# Patient Record
Sex: MALE | Race: WHITE | NOT HISPANIC OR LATINO | Employment: FULL TIME | URBAN - METROPOLITAN AREA
[De-identification: names, ages, dates, MRNs, and addresses within clinical notes are randomized per-mention and may not be internally consistent; named-entity substitution may affect disease eponyms.]

---

## 2017-02-22 ENCOUNTER — OFFICE VISIT (OUTPATIENT)
Dept: FAMILY MEDICINE | Facility: CLINIC | Age: 42
End: 2017-02-22
Payer: COMMERCIAL

## 2017-02-22 VITALS
SYSTOLIC BLOOD PRESSURE: 137 MMHG | OXYGEN SATURATION: 96 % | WEIGHT: 205.13 LBS | HEIGHT: 70 IN | TEMPERATURE: 98.7 F | HEART RATE: 88 BPM | BODY MASS INDEX: 29.37 KG/M2 | DIASTOLIC BLOOD PRESSURE: 89 MMHG

## 2017-02-22 DIAGNOSIS — Z13.220 SCREENING FOR LIPOID DISORDERS: ICD-10-CM

## 2017-02-22 DIAGNOSIS — Z13.1 SCREENING FOR DIABETES MELLITUS: ICD-10-CM

## 2017-02-22 DIAGNOSIS — J01.90 ACUTE SINUSITIS WITH SYMPTOMS > 10 DAYS: Primary | ICD-10-CM

## 2017-02-22 LAB
CHOLEST SERPL-MCNC: 214 MG/DL
HBA1C MFR BLD: 5.1 % (ref 4.3–6)
HDLC SERPL-MCNC: 62 MG/DL
LDLC SERPL CALC-MCNC: 124 MG/DL
NONHDLC SERPL-MCNC: 152 MG/DL
TRIGL SERPL-MCNC: 140 MG/DL

## 2017-02-22 PROCEDURE — 83036 HEMOGLOBIN GLYCOSYLATED A1C: CPT | Performed by: PHYSICIAN ASSISTANT

## 2017-02-22 PROCEDURE — 99214 OFFICE O/P EST MOD 30 MIN: CPT | Performed by: PHYSICIAN ASSISTANT

## 2017-02-22 PROCEDURE — 80061 LIPID PANEL: CPT | Performed by: PHYSICIAN ASSISTANT

## 2017-02-22 PROCEDURE — 36415 COLL VENOUS BLD VENIPUNCTURE: CPT | Performed by: PHYSICIAN ASSISTANT

## 2017-02-22 NOTE — NURSING NOTE
"Chief Complaint   Patient presents with     URI       Initial /89  Pulse 88  Temp 98.7  F (37.1  C) (Oral)  Ht 5' 10\" (1.778 m)  Wt 205 lb 2 oz (93 kg)  SpO2 96%  BMI 29.43 kg/m2 Estimated body mass index is 29.43 kg/(m^2) as calculated from the following:    Height as of this encounter: 5' 10\" (1.778 m).    Weight as of this encounter: 205 lb 2 oz (93 kg).  Medication Reconciliation: complete     Sallie Hernandez CMA      "

## 2017-02-22 NOTE — MR AVS SNAPSHOT
After Visit Summary   2/22/2017    Nicholas Cooney    MRN: [id number]           Patient Information     Date Of Birth          1975        Visit Information        Provider Department      2/22/2017 9:20 AM Stephane Beltran PA [facility]        Today's Diagnoses     Acute sinusitis with symptoms > 10 days    -  1    Screening for lipoid disorders        Screening for diabetes mellitus          Care Instructions      Sinusitis [Abx Tx]    The sinuses are air-filled spaces within the bones of the face. They connect to the inside of the nose. Sinusitis is an inflammation of the tissue lining the sinus cavity. Sinus inflammation can occur during a cold or hay-fever (allergies to pollens and other particles in the air) and cause symptoms of sinus congestion and fullness. A sinus infection causes fever, headache and facial pain. There is usually green or yellow drainage from the nose or into the back of the throat (post-nasal drip). Antibiotics are prescribed to treat this condition.  Home Care:  Drink plenty of water, hot tea, and other liquids to stay well hydrated. This thins the mucus and promotes sinus drainage.  Apply heat to the painful areas of the face. Use a towel soaked in hot water. Or,  the shower and direct the hot spray onto your face. This is a good way to inhale warm water vapor and get heat on your face at the same time. (Cover your mouth and nose with your hands so you can still breathe as you do this.)  Use a vaporizer with products such as Vicks VapoRub (contains menthol) at night. Suck on peppermint, menthol or eucalyptus hard candies during the day.  An expectorant containing guaifenesin (such as Robitussin), helps to thin the mucus and promote drainage from the sinuses.  Over-the-counter decongestants may be used unless a similar medicine was prescribed. Nasal sprays work the fastest. Use one that contains phenylephrine (Jarret-synephrine, Sinex  and others) or oxymetazoline (Afrin). First blow the nose gently to remove mucus, then apply the drops. Do not use these medicines more often than directed on the label or for more than three days or symptoms may worsen. You may also use tablets containing pseudoephedrine (Sudafed). Many sinus remedies combine ingredients, which may increase side effects. Read the labels or ask the pharmacist for help. NOTE: Persons with high blood pressure should not use decongestants. They can raise blood pressure.  Antihistamines are useful if allergies are a cause of your sinusitis. The mildest one is chlorpheniramine (available without a prescription). The dose for adults is 8-12mg three times a day. [NOTE: Do not use chlorpheniramine if you have glaucoma or if you are a man with trouble urinating due to an enlarged prostate.] Claritin (loratidine) is an antihistamine that causes less drowsiness and is a good alternative for daytime use.  Do not use nasal rinses or irrigation during an acute sinus infection, unless advised by your doctor. Rinsing may spread the infection to other sinuses.  You may use acetaminophen (Tylenol) or ibuprofen (Motrin, Advil) to control pain, unless another pain medicine was prescribed. [ NOTE: If you have chronic liver or kidney disease or ever had a stomach ulcer, talk with your doctor before using these medicines.] (Aspirin should never be used in anyone under 18 years of age who is ill with a fever. It may cause severe liver damage.)  Finish the full course, even if you are feeling better after a few days.  Follow Up  with your doctor or this facility in one week or as instructed by our staff if not improving.  Get Prompt Medical Attention  if any of the following occur:  Facial pain or headache becomes more severe  Stiff neck  Unusual drowsiness or confusion, or not acting like your normal self  Swelling of the forehead or eyelids  Vision problems including blurred or double vision  Fever of  "100.4 F (38 C) or higher, or as directed by your healthcare provider  Seizure    [id number] [employer]il[employer], [address] [place] [address], [place] [address]. All rights reserved. This information is not intended as a substitute for professional medical care. Always follow your healthcare professional's instructions.              Follow-ups after your visit        Follow-up notes from your care team     Return if symptoms worsen or fail to improve.      Who to contact     If you have questions or need follow up information about today's clinic visit or your schedule please contact [facility] directly at [phone].  Normal or non-critical lab and imaging results will be communicated to you by [employer], letter or phone within 4 business days after the clinic has received the results. If you do not hear from us within 7 days, please contact the clinic through [employer] or phone. If you have a critical or abnormal lab result, we will notify you by phone as soon as possible.  Submit refill requests through [facility] or call your pharmacy and they will forward the refill request to us. Please allow 3 business days for your refill to be completed.          Additional Information About Your Visit        MyChart Information     [facility] lets you send messages to your doctor, view your test results, renew your prescriptions, schedule appointments and more. To sign up, go to [email or web address] . Click on \"Log in\" on the left side of the screen, which will take you to the Welcome page. Then click on \"Sign up Now\" on the right side of the page.     You will be asked to enter the access code listed below, as well as some personal information. Please follow the directions to create your username and password.     Your access code is: [email or web address]  Expires: 2017  9:30 AM     Your access code will  in 90 days. If you need help or a new code, please call your [facility] or [phone].        Care " "EveryWhere ID     This is your Care EveryWhere ID. This could be used by other organizations to access your [facility] medical records  [id number]        Your Vitals Were     Pulse Temperature Height Pulse Oximetry BMI (Body Mass Index)       88 98.7  F (37.1  C) (Oral) 5' 10\" (1.778 m) 96% 29.43 kg/m2        Blood Pressure from Last 3 Encounters:   02/22/17 137/89   11/04/14 129/85   10/21/14 129/87    Weight from Last 3 Encounters:   02/22/17 205 lb 2 oz (93 kg)   11/04/14 201 lb 6 oz (91.3 kg)   10/21/14 201 lb 6.4 oz (91.4 kg)              We Performed the Following     Hemoglobin A1c     Lipid panel reflex to direct LDL          Today's Medication Changes          These changes are accurate as of: 2/22/17  9:30 AM.  If you have any questions, ask your nurse or doctor.               Start taking these medicines.        Dose/Directions    amoxicillin-clavulanate 875-125 MG per tablet   Commonly known as:  AUGMENTIN   Used for:  Acute sinusitis with symptoms > 10 days   Started by:  Stephane Beltran PA        Dose:  1 tablet   Take 1 tablet by mouth 2 times daily for 7 days   Quantity:  14 tablet   Refills:  0            Where to get your medicines      These medications were sent to [facility] - [place], [place] - [address]  [address], [facility] [address]     Phone:  [phone]     amoxicillin-clavulanate 875-125 MG per tablet                Primary Care Provider    None Specified       No primary provider on file.        Thank you!     Thank you for choosing [facility]  for your care. Our goal is always to provide you with excellent care. Hearing back from our patients is one way we can continue to improve our services. Please take a few minutes to complete the written survey that you may receive in the mail after your visit with us. Thank you!             Your Updated Medication List - Protect others around you: Learn how to safely use, " store and throw away your medicines at www.disposemymeds.org.          This list is accurate as of: 2/22/17  9:30 AM.  Always use your most recent med list.                   Brand Name Dispense Instructions for use    amoxicillin-clavulanate 875-125 MG per tablet    AUGMENTIN    14 tablet    Take 1 tablet by mouth 2 times daily for 7 days

## 2017-02-22 NOTE — PROGRESS NOTES
"  SUBJECTIVE:                                                    Nicholas Cooney is a 41 year old male who presents to clinic today for the following health issues:      Acute Illness   Acute illness concerns: cough  Onset: 10 days ago     Fever: no    Chills/Sweats: YES    Headache (location?): no    Sinus Pressure:YES    Conjunctivitis:  no    Ear Pain: YES: both    Rhinorrhea: YES    Congestion: YES    Sore Throat: YES     Cough: YES-non-productive, productive of yellow sputum    Wheeze: no    Decreased Appetite: no    Nausea: no    Vomiting: no    Diarrhea:  no    Dysuria/Freq.: no    Fatigue/Achiness: YES    Sick/Strep Exposure: YES- parents      Therapies Tried and outcome: vics day and night      patient had only a few sips of soda this am.        No Known Allergies    Past Medical History   Diagnosis Date     CARDIOVASCULAR SCREENING; LDL GOAL LESS THAN 160 5/12/2011         No current outpatient prescriptions on file prior to visit.  No current facility-administered medications on file prior to visit.     Social History   Substance Use Topics     Smoking status: Never Smoker     Smokeless tobacco: Never Used     Alcohol use Yes      Comment: social       ROS:  Consitutional: As above  ENT: As above  Respiratory: As above    OBJECTIVE:  /89  Pulse 88  Temp 98.7  F (37.1  C) (Oral)  Ht 5' 10\" (1.778 m)  Wt 205 lb 2 oz (93 kg)  SpO2 96%  BMI 29.43 kg/m2  GENERAL APPEARANCE: healthy, alert and no distress  EYES: conjunctiva clear  HENT:   TMs w/o erythema, effusion or bulging.  Nose and mouth without ulcers, erythema or lesions.  NO tonsillar enlargement erythema or exudates.   NECK: supple, nontender, no lymphadenopathy  RESP: lungs clear to auscultation - no rales, rhonchi or wheezes  CV: regular rates and rhythm, normal S1 S2, no murmur noted  NEURO: awake, alert          ASSESSMENT: Well appearing.    ICD-10-CM    1. Acute sinusitis with symptoms > 10 days J01.90 amoxicillin-clavulanate (AUGMENTIN) " 875-125 MG per tablet   2. Screening for lipoid disorders Z13.220 Lipid panel reflex to direct LDL   3. Screening for diabetes mellitus Z13.1 Hemoglobin A1c         PLAN:advised routine yearly follow up  Lots of rest and fluids.  RTC if any worsening symptoms or if not improving.    Hemanth Beltran PA-C

## 2017-02-22 NOTE — PROGRESS NOTES
Please mail letter to patient as below:     Your LDL (bad cholesterol)  was mildly above goal.  Genetics, diet, weight and low exercise levels can contribute to this. Your HDL (good cholesterol) and triglycerides were normal.  This is good. You need to recheck fasting labs yearly. Maintaining a healthy diet with lean proteins, whole grains and healthy fats such as olive oil as well as regular exercise and maintaining an appropriate weight all contribute to healthier cholesterol levels. Your blood sugar was normal.      Stephane Beltran

## 2017-02-22 NOTE — PATIENT INSTRUCTIONS
Sinusitis [Abx Tx]    The sinuses are air-filled spaces within the bones of the face. They connect to the inside of the nose. Sinusitis is an inflammation of the tissue lining the sinus cavity. Sinus inflammation can occur during a cold or hay-fever (allergies to pollens and other particles in the air) and cause symptoms of sinus congestion and fullness. A sinus infection causes fever, headache and facial pain. There is usually green or yellow drainage from the nose or into the back of the throat (post-nasal drip). Antibiotics are prescribed to treat this condition.  Home Care:  Drink plenty of water, hot tea, and other liquids to stay well hydrated. This thins the mucus and promotes sinus drainage.  Apply heat to the painful areas of the face. Use a towel soaked in hot water. Or,  the shower and direct the hot spray onto your face. This is a good way to inhale warm water vapor and get heat on your face at the same time. (Cover your mouth and nose with your hands so you can still breathe as you do this.)  Use a vaporizer with products such as Vicks VapoRub (contains menthol) at night. Suck on peppermint, menthol or eucalyptus hard candies during the day.  An expectorant containing guaifenesin (such as Robitussin), helps to thin the mucus and promote drainage from the sinuses.  Over-the-counter decongestants may be used unless a similar medicine was prescribed. Nasal sprays work the fastest. Use one that contains phenylephrine (Jarret-synephrine, Sinex and others) or oxymetazoline (Afrin). First blow the nose gently to remove mucus, then apply the drops. Do not use these medicines more often than directed on the label or for more than three days or symptoms may worsen. You may also use tablets containing pseudoephedrine (Sudafed). Many sinus remedies combine ingredients, which may increase side effects. Read the labels or ask the pharmacist for help. NOTE: Persons with high blood pressure should not use  decongestants. They can raise blood pressure.  Antihistamines are useful if allergies are a cause of your sinusitis. The mildest one is chlorpheniramine (available without a prescription). The dose for adults is 8-12mg three times a day. [NOTE: Do not use chlorpheniramine if you have glaucoma or if you are a man with trouble urinating due to an enlarged prostate.] Claritin (loratidine) is an antihistamine that causes less drowsiness and is a good alternative for daytime use.  Do not use nasal rinses or irrigation during an acute sinus infection, unless advised by your doctor. Rinsing may spread the infection to other sinuses.  You may use acetaminophen (Tylenol) or ibuprofen (Motrin, Advil) to control pain, unless another pain medicine was prescribed. [ NOTE: If you have chronic liver or kidney disease or ever had a stomach ulcer, talk with your doctor before using these medicines.] (Aspirin should never be used in anyone under 18 years of age who is ill with a fever. It may cause severe liver damage.)  Finish the full course, even if you are feeling better after a few days.  Follow Up  with your doctor or this facility in one week or as instructed by our staff if not improving.  Get Prompt Medical Attention  if any of the following occur:  Facial pain or headache becomes more severe  Stiff neck  Unusual drowsiness or confusion, or not acting like your normal self  Swelling of the forehead or eyelids  Vision problems including blurred or double vision  Fever of 100.4 F (38 C) or higher, or as directed by your healthcare provider  Seizure    [id number] [facility], [address] [place] [address], [place] [address]. All rights reserved. This information is not intended as a substitute for professional medical care. Always follow your healthcare professional's instructions.

## 2019-04-11 ENCOUNTER — OFFICE VISIT (OUTPATIENT)
Dept: OPHTHALMOLOGY | Facility: CLINIC | Age: 44
End: 2019-04-11
Payer: COMMERCIAL

## 2019-04-11 DIAGNOSIS — Z98.890 S/P LASIK SURGERY OF BOTH EYES: Primary | ICD-10-CM

## 2019-04-11 DIAGNOSIS — H52.4 PRESBYOPIA: ICD-10-CM

## 2019-04-11 DIAGNOSIS — H04.123 DRY EYE SYNDROME, BILATERAL: ICD-10-CM

## 2019-04-11 DIAGNOSIS — H02.402 PTOSIS OF EYELID, LEFT: ICD-10-CM

## 2019-04-11 PROCEDURE — 92015 DETERMINE REFRACTIVE STATE: CPT | Performed by: STUDENT IN AN ORGANIZED HEALTH CARE EDUCATION/TRAINING PROGRAM

## 2019-04-11 PROCEDURE — 92004 COMPRE OPH EXAM NEW PT 1/>: CPT | Performed by: STUDENT IN AN ORGANIZED HEALTH CARE EDUCATION/TRAINING PROGRAM

## 2019-04-11 ASSESSMENT — TONOMETRY
OS_IOP_MMHG: 16
OD_IOP_MMHG: 16
IOP_METHOD: APPLANATION

## 2019-04-11 ASSESSMENT — REFRACTION_MANIFEST
OD_AXIS: 012
OS_SPHERE: PLANO
OD_CYLINDER: +0.75
OD_ADD: +1.50
OS_ADD: +1.50
OS_CYLINDER: SPHERE
OD_SPHERE: -0.25

## 2019-04-11 ASSESSMENT — REFRACTION
OD_AXIS: 010
OD_SPHERE: +1.00
OD_CYLINDER: +0.75

## 2019-04-11 ASSESSMENT — EXTERNAL EXAM - LEFT EYE: OS_EXAM: MILD BROW PTOSIS

## 2019-04-11 ASSESSMENT — CUP TO DISC RATIO
OS_RATIO: 0.45
OD_RATIO: 0.4

## 2019-04-11 ASSESSMENT — CONF VISUAL FIELD
OS_NORMAL: 1
OD_NORMAL: 1

## 2019-04-11 ASSESSMENT — VISUAL ACUITY
OD_SC: 20/30+3
OD_SC: J3
OS_SC: 20/20
OS_SC: J2
METHOD: SNELLEN - LINEAR

## 2019-04-11 ASSESSMENT — EXTERNAL EXAM - RIGHT EYE: OD_EXAM: NORMAL

## 2019-04-11 ASSESSMENT — SLIT LAMP EXAM - LIDS
COMMENTS: NORMAL
COMMENTS: MILD PTOSIS

## 2019-04-11 NOTE — PROGRESS NOTES
" Current Eye Medications:  Visine tears prn     Subjective:  Comprehensive eye exam.   Pt states he first noticed last week that vision in his right is a little blurred. Pt states he had Lasik about 13 years ago and wonders if he needs to have an enhancement.    Tech note: great difference between  lid fissure openings, left eye droops and right eye almost looks proptotic. Patient states this difference happened about a year ago, has been gradual, and is common in his family.     Objective:  See Ophthalmology Exam.       Assessment:  Nicholas Cooney is a 43 year old male who presents with:   Encounter Diagnoses   Name Primary?     S/P LASIK surgery of both eyes      Ptosis of eyelid, left Not acute, possible miosis (was dilated prior to MD evaluation).     Dry eye syndrome, bilateral        Plan:  Use over the counter readers (+1.50)    Use artificial tears up to four times a day (like Refresh Optive, Systane Balance, TheraTears, or generic artificial tears are ok. Avoid \"get the red out\" drops).     Return for evaluation for possible Pb's syndrome (with lid droopiness and unequal pupils)    Form for LASIK Plus completed per patient request    Cheng Ro MD  [phone]      "

## 2019-04-11 NOTE — PATIENT INSTRUCTIONS
"Use over the counter readers (+1.50)    Use artificial tears up to four times a day (like Refresh Optive, Systane Balance, TheraTears, or generic artificial tears are ok. Avoid \"get the red out\" drops).     Return for evaluation for possible Pb's syndrome (with lid droopiness and unequal pupils)    Cheng Ro MD  [phone]    "

## 2019-04-11 NOTE — LETTER
"    4/11/2019         RE: Nicholas Cooney  [address]  [place] [address]        Dear Colleague,    Thank you for referring your patient, Nicholas Cooney, to the [facility]. Please see a copy of my visit note below.     Current Eye Medications:  Visine tears prn     Subjective:  Comprehensive eye exam.   Pt states he first noticed last week that vision in his right is a little blurred. Pt states he had Lasik about 13 years ago and wonders if he needs to have an enhancement.    Tech note: great difference between  lid fissure openings, left eye droops and right eye almost looks proptotic. Patient states this difference happened about a year ago, has been gradual, and is common in his family.     Objective:  See Ophthalmology Exam.       Assessment:  Nicholas Cooney is a 43 year old male who presents with:   Encounter Diagnoses   Name Primary?     S/P LASIK surgery of both eyes      Ptosis of eyelid, left Not acute, possible miosis (was dilated prior to MD evaluation).     Dry eye syndrome, bilateral        Plan:  Use over the counter readers (+1.50)    Use artificial tears up to four times a day (like Refresh Optive, Systane Balance, TheraTears, or generic artificial tears are ok. Avoid \"get the red out\" drops).     Return for evaluation for possible Pb's syndrome (with lid droopiness and unequal pupils)    Form for LASIK Plus completed per patient request    Cheng Ro MD  [phone]        Again, thank you for allowing me to participate in the care of your patient.        Sincerely,        Cheng Ro MD    "

## 2019-04-26 ENCOUNTER — OFFICE VISIT (OUTPATIENT)
Dept: OPHTHALMOLOGY | Facility: CLINIC | Age: 44
End: 2019-04-26
Payer: COMMERCIAL

## 2019-04-26 DIAGNOSIS — Z98.890 S/P LASIK SURGERY OF BOTH EYES: ICD-10-CM

## 2019-04-26 DIAGNOSIS — H04.123 DRY EYE SYNDROME, BILATERAL: ICD-10-CM

## 2019-04-26 DIAGNOSIS — H57.02 ANISOCORIA: Primary | ICD-10-CM

## 2019-04-26 DIAGNOSIS — H02.402 PTOSIS OF EYELID, LEFT: ICD-10-CM

## 2019-04-26 PROCEDURE — 92012 INTRM OPH EXAM EST PATIENT: CPT | Performed by: STUDENT IN AN ORGANIZED HEALTH CARE EDUCATION/TRAINING PROGRAM

## 2019-04-26 ASSESSMENT — EXTERNAL EXAM - RIGHT EYE: OD_EXAM: NORMAL

## 2019-04-26 ASSESSMENT — SLIT LAMP EXAM - LIDS: COMMENTS: NORMAL

## 2019-04-26 ASSESSMENT — VISUAL ACUITY
OS_SC: 20/20
OD_SC: 20/30-2
METHOD: SNELLEN - LINEAR
OD_PH_SC: 20/20

## 2019-04-26 ASSESSMENT — REFRACTION_MANIFEST
OD_CYLINDER: +0.75
OD_SPHERE: -0.25
OD_AXIS: 012

## 2019-04-26 NOTE — LETTER
4/26/2019         RE: Nicholas Cooney  [address] [place] [address] [address]  [place] [address]        Dear Colleague,    Thank you for referring your patient, Nicholas Cooney, to the [facility]. Please see a copy of my visit note below.     Current Eye Medications:  Tears daily right eye     Subjective: Pupil check/possible pilocarpine or apraclonidine test.  Pt report vision in his right eye continues to be blurred.     Objective:  See Ophthalmology Exam.      Assessment:  Nicholas Cooney is a 43 year old male who presents with:   Encounter Diagnoses   Name Primary?     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 Physiologic, possibly Adie's - asymptomatic.      S/P LASIK surgery of both eyes      Ptosis of eyelid, left      Dry eye syndrome, bilateral        Plan:  Recommend annual eye exams    Cheng Ro MD  [phone]        Again, thank you for allowing me to participate in the care of your patient.        Sincerely,        Cheng Ro MD

## 2019-04-26 NOTE — PROGRESS NOTES
Current Eye Medications:  Tears daily right eye     Subjective: Pupil check/possible pilocarpine or apraclonidine test.  Pt report vision in his right eye continues to be blurred.     Objective:  See Ophthalmology Exam.      Assessment:  Nicholas Cooney is a 43 year old male who presents with:   Encounter Diagnoses   Name Primary?     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 Physiologic, possibly Adie's - asymptomatic.      S/P LASIK surgery of both eyes      Ptosis of eyelid, left      Dry eye syndrome, bilateral        Plan:  Recommend annual eye exams    Cheng Ro MD  [phone]

## 2021-06-12 ENCOUNTER — HEALTH MAINTENANCE LETTER (OUTPATIENT)
Age: 46
End: 2021-06-12

## 2021-10-02 ENCOUNTER — HEALTH MAINTENANCE LETTER (OUTPATIENT)
Age: 46
End: 2021-10-02

## 2022-07-09 ENCOUNTER — HEALTH MAINTENANCE LETTER (OUTPATIENT)
Age: 47
End: 2022-07-09

## 2022-09-03 ENCOUNTER — HEALTH MAINTENANCE LETTER (OUTPATIENT)
Age: 47
End: 2022-09-03

## 2023-01-13 ENCOUNTER — OFFICE VISIT (OUTPATIENT)
Dept: URGENT CARE | Facility: URGENT CARE | Age: 48
End: 2023-01-13
Payer: COMMERCIAL

## 2023-01-13 ENCOUNTER — ANCILLARY PROCEDURE (OUTPATIENT)
Dept: GENERAL RADIOLOGY | Facility: CLINIC | Age: 48
End: 2023-01-13
Attending: NURSE PRACTITIONER
Payer: COMMERCIAL

## 2023-01-13 VITALS
SYSTOLIC BLOOD PRESSURE: 125 MMHG | DIASTOLIC BLOOD PRESSURE: 84 MMHG | TEMPERATURE: 98 F | OXYGEN SATURATION: 97 % | BODY MASS INDEX: 28.48 KG/M2 | WEIGHT: 198.5 LBS | HEART RATE: 77 BPM

## 2023-01-13 DIAGNOSIS — S69.92XA WRIST INJURY, LEFT, INITIAL ENCOUNTER: ICD-10-CM

## 2023-01-13 DIAGNOSIS — S62.115A CLOSED NONDISPLACED FRACTURE OF TRIQUETRUM OF LEFT WRIST, INITIAL ENCOUNTER: Primary | ICD-10-CM

## 2023-01-13 PROCEDURE — 73110 X-RAY EXAM OF WRIST: CPT | Mod: TC | Performed by: RADIOLOGY

## 2023-01-13 PROCEDURE — 99204 OFFICE O/P NEW MOD 45 MIN: CPT | Performed by: NURSE PRACTITIONER

## 2023-01-13 NOTE — PROGRESS NOTES
Assessment & Plan     Closed nondisplaced fracture of triquetrum of left wrist, initial encounter    - Orthopedic  Referral    Wrist injury, left, initial encounter    - XR Wrist Left G/E 3 Views     Reviewed xray images and results during visit showing nondisplaced left wrist triquetrum fracture. He is splinted with blue premade volar splint and ACE wrap. RICE: rest, ice, compress, elevate. 800 mg ibuprofen with 1000 mg tylenol with food every 8 hours as needed. Urgent orthopedics referral placed for further evaluation. Neurovascularly stable currently.     Follow-up with orthopedics within 5 days, and sooner if symptoms worsen or new symptoms develop.     Discussed red flag symptoms which warrant immediate visit in emergency room    All questions were answered and patient verbalized understanding. AVS reviewed with patient.     Jaky Iyer, YUE, APRN, CNP 1/13/2023 11:24 AM  [facility] 47 [facility] who presents to clinic today for the following health issues:  Chief Complaint   Patient presents with     Wrist Injury     Pt was walking dog last night tripped on ice and fell, pt fell backwards nd caught himself with left hand, swollen, painful to twist left wrist       MS Injury/Pain    Onset of symptoms was 1 day(s) ago.  Location: left wrist  Context:  The injury happened while falling on ice while walking his dog landing on left hand  Course of symptoms is same.    Severity moderate  Current and Associated symptoms: Pain, Swelling and Decreased range of motion  Denies  Bruising, Warmth and Redness  Aggravating Factors: movement  Therapies to improve symptoms include: none  This is not the first time this type of problem has occurred for this patient.   He broke his left wrist years ago    Problem list, Medication list, Allergies, and Medical history reviewed in EPIC.    ROS:  Review of systems negative except for noted above         Objective    /84 (BP Location: Left arm, Patient Position: Sitting, Cuff Size: Adult Regular)   Pulse 77   Temp 98  F (36.7  C) (Tympanic)   Wt 90 kg (198 lb 8 oz)   SpO2 97%   BMI 28.48 kg/m    Physical Exam  Constitutional:       General: He is not in acute distress.     Appearance: He is not toxic-appearing or diaphoretic.   Cardiovascular:      Pulses: Normal pulses.   Musculoskeletal:      Left forearm: Normal.      Left wrist: Swelling and bony tenderness present. Decreased range of motion.      Left hand: Normal.      Comments: Moderate swelling left wrist. Able to pronate and supinate. Decreased ROM flexion and extension left wrist. Tenderness with palpation medial left wrist   Skin:     General: Skin is warm and dry.      Capillary Refill: Capillary refill takes less than 2 seconds.      Findings: Bruising present. No erythema.      Comments: Mild bruising left wrist   Neurological:      Mental Status: He is alert.      Sensory: No sensory deficit.          X-ray left wrist was performed and reviewed independently by myself showing possible medial left wrist fracture  Radiologist impression:   Results for orders placed or performed in visit on 01/13/23   XR Wrist Left G/E 3 Views     Status: None (Preliminary result)    Narrative    EXAM: WRIST LEFT THREE OR MORE VIEWS  DATE/TIME: 1/13/2023 10:39 AM    INDICATION: Wrist pain after fall. Wrist injury, left, initial  encounter.  COMPARISON: None available.       Impression    IMPRESSION: Nondisplaced dorsal left triquetral fracture. Moderate  dorsal left wrist soft tissue swelling. Chronic healed screw-fixed  left scaphoid fracture without evidence for hardware failure. Mild STT  joint osteoarthritis. No advanced joint space narrowing. No definite  distal radius or ulna fracture.

## 2023-01-18 NOTE — PROGRESS NOTES
ASSESSMENT & PLAN    Diagnoses and all orders for this visit:    Closed nondisplaced fracture of triquetrum of left wrist, initial encounter  -     XR Wrist Left G/E 3 Views; Future  -     Orthopedic  Referral      This issue is acute and Unchanged.    We discussed these other possible diagnosis: Triquetral fracture, discussed immobilization typically 4-6 weeks.    Plan:  - Today's Plan of Care:  Discussed immobilization options - Exos given upcoming trip  Limit heavy lifting, weight bearing left hand    Discussed activity considerations and other supportive care including Ice/Heat, OTC and other topical medications as needed.    -We also discussed other future treatment options:  Referral to Occupational Hand Therapy    Follow Up: 4 weeks with repeat x-rays    Concerning signs and symptoms were reviewed.  The patient expressed understanding of this management plan and all questions were answered at this time.    Samira Spear MD [facility]  Sports Medicine Physician  [facility]      -----  No chief complaint on file.      SUBJECTIVE  Nicholas Cooney is a/an 47 year old male who is seen as an  referral for evaluation of left wrist injury.     The patient is seen by themselves.  The patient is Right handed    Onset: 1 week(s) ago. 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     REVIEW OF SYSTEMS:  Review of Systems  Skin: no bruising, yes swelling  Musculoskeletal: as above  Neurologic: no numbness, paresthesias  Remainder of review of systems is  negative including constitutional, CV, pulmonary, GI, except as noted in HPI or medical history.    OBJECTIVE:  BP (!) 146/79 (BP Location: Right arm)   Pulse 85   Wt 89.8 kg (198 lb)   BMI 28.41 kg/m     General: healthy, alert and in no distress  HEENT: no scleral icterus or conjunctival erythema  Skin: no suspicious lesions or rash. No jaundice.  CV: distal perfusion intact   Resp: normal respiratory effort without conversational dyspnea   Psych: normal mood and affect  Gait: NORMAL  Neuro: Normal light sensory exam of upper extremity    Bilateral Wrist and Hand exam    Inspection:       Swelling: mild left wrist    Tender:       Dorsal wrist    Non Tender:       Remainder of the Wrist and Hand bilateral    ROM:       Slightly decreased ROM left wrist    Strength:       5/5 strength in the muscles of the hand, wrist and forearm bilateral    Neurovascular:       2+ radial pulses bilaterally with brisk capillary refill and      normal sensation to light touch in the radial, median and ulnar nerve distributions    RADIOLOGY:  I independently ordered, visualized and reviewed these images with the patient  3 XR views of left wrist reviewed and compared to 1/13/2023 : small non displaced dorsal triquetral fracture, healed scaphoid fracture with screw fixation, mild STT joint degenerative changes  - will follow official read      Review of prior external note(s) from -  note  Review of the result(s) of each unique test - XR

## 2023-01-19 ENCOUNTER — OFFICE VISIT (OUTPATIENT)
Dept: ORTHOPEDICS | Facility: CLINIC | Age: 48
End: 2023-01-19
Attending: NURSE PRACTITIONER
Payer: COMMERCIAL

## 2023-01-19 ENCOUNTER — ANCILLARY PROCEDURE (OUTPATIENT)
Dept: GENERAL RADIOLOGY | Facility: CLINIC | Age: 48
End: 2023-01-19
Attending: PEDIATRICS
Payer: COMMERCIAL

## 2023-01-19 VITALS
SYSTOLIC BLOOD PRESSURE: 146 MMHG | WEIGHT: 198 LBS | HEART RATE: 85 BPM | DIASTOLIC BLOOD PRESSURE: 79 MMHG | BODY MASS INDEX: 28.41 KG/M2

## 2023-01-19 DIAGNOSIS — S62.115A CLOSED NONDISPLACED FRACTURE OF TRIQUETRUM OF LEFT WRIST, INITIAL ENCOUNTER: ICD-10-CM

## 2023-01-19 PROCEDURE — 99204 OFFICE O/P NEW MOD 45 MIN: CPT | Performed by: PEDIATRICS

## 2023-01-19 PROCEDURE — 73110 X-RAY EXAM OF WRIST: CPT | Mod: TC | Performed by: RADIOLOGY

## 2023-01-19 NOTE — PATIENT INSTRUCTIONS
We discussed these other possible diagnosis: Triquetral fracture, discussed immobilization typically 4-6 weeks.    Plan:  - Today's Plan of Care:  Discussed immobilization options - Exos given upcoming trip  Limit heavy lifting, weight bearing left hand    Discussed activity considerations and other supportive care including Ice/Heat, OTC and other topical medications as needed.    -We also discussed other future treatment options:  Referral to Occupational Hand Therapy    Follow Up: 4 weeks with repeat x-rays    If you have any further questions for your physician or physician s care team you can call [phone] and use option 3 to leave a voice message.

## 2023-01-19 NOTE — LETTER
1/19/2023         RE: Nicholas Cooney  [address]  [place] [address]        Dear Colleague,    Thank you for referring your patient, Nicholas Cooney, to the [facility]. Please see a copy of my visit note below.    ASSESSMENT & PLAN    Diagnoses and all orders for this visit:    Closed nondisplaced fracture of triquetrum of left wrist, initial encounter  -     XR Wrist Left G/E 3 Views; Future  -     Orthopedic  Referral      This issue is acute and Unchanged.    We discussed these other possible diagnosis: Triquetral fracture, discussed immobilization typically 4-6 weeks.    Plan:  - Today's Plan of Care:  Discussed immobilization options - [place]os given upcoming trip  Limit heavy lifting, weight bearing left hand    Discussed activity considerations and other supportive care including Ice/Heat, OTC and other topical medications as needed.    -We also discussed other future treatment options:  Referral to Occupational Hand Therapy    Follow Up: 4 weeks with repeat x-rays    Concerning signs and symptoms were reviewed.  The patient expressed understanding of this management plan and all questions were answered at this time.    Samira Spear MD [facility]  Sports Medicine Physician  [facility]      -----  No chief complaint on file.      SUBJECTIVE  Nicholas Cooney is a/an 47 year old male who is seen as an UC referral for evaluation of left wrist injury.     The patient is seen by themselves.  The patient is Right handed    Onset: 1 week(s) ago. 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     REVIEW OF SYSTEMS:  Review of Systems  Skin: no bruising, yes swelling  Musculoskeletal: as above  Neurologic: no numbness, paresthesias  Remainder of review of systems is negative including constitutional, CV, pulmonary, GI, except as noted in HPI or medical history.    OBJECTIVE:  BP (!) 146/79 (BP Location: Right arm)   Pulse 85   Wt 89.8 kg (198 lb)   BMI 28.41 kg/m     General: healthy, alert and in no distress  HEENT: no scleral icterus or conjunctival erythema  Skin: no suspicious lesions or rash. No jaundice.  CV: distal perfusion intact   Resp: normal respiratory effort without conversational dyspnea   Psych: normal mood and affect  Gait: NORMAL  Neuro: Normal light sensory exam of upper extremity    Bilateral Wrist and Hand exam    Inspection:       Swelling: mild left wrist    Tender:       Dorsal wrist    Non Tender:       Remainder of the Wrist and Hand bilateral    ROM:       Slightly decreased ROM left wrist    Strength:       5/5 strength in the muscles of the hand, wrist and forearm bilateral    Neurovascular:       2+ radial pulses bilaterally with brisk capillary refill and      normal sensation to light touch in the radial, median and ulnar nerve distributions    RADIOLOGY:  I independently ordered, visualized and reviewed these images with the patient  3 XR views of left wrist reviewed and compared to 1/13/2023 : small non displaced dorsal triquetral fracture, healed scaphoid fracture with screw fixation, mild STT joint degenerative changes  - will follow official read      Review of prior external note(s) from -  note  Review of the result(s) of each unique test - XR             Again, thank you for allowing me to participate in the care of your patient.        Sincerely,        Samira Spear MD

## 2023-02-10 NOTE — PROGRESS NOTES
ASSESSMENT & PLAN    Nicholas was seen today for recheck.    Diagnoses and all orders for this visit:    Closed nondisplaced fracture of triquetrum of left wrist, initial encounter  -     XR Wrist Left G/E 3 Views; Future      This issue is acute and Improving.    We discussed these other possible diagnosis: Healing triquetral fracture, can transition out of brace    Plan:  - Today's Plan of Care:  Gradually wean out of brace over the next 2-3 weeks  Start gentle range of motion exercises  Discussed activity considerations and other supportive care including Ice/Heat, OTC and other topical medications as needed.    Would limit heavy lifting and weight bearing another month or so    -We also discussed other future treatment options:  Referral to Occupational Therapy    Follow Up: 1 month as needed    Concerning signs and symptoms were reviewed.  The patient expressed understanding of this management plan and all questions were answered at this time.    Samira Spear MD [facility]  Sports Medicine Physician  [facility]      SUBJECTIVE- Interim History February 10, 2023    Chief Complaint   Patient presents with     Left Wrist - RECHECK       Nicholas Cooney is a 47 year old male who is seen in f/u up for left wrist fracure. Since last visit on 1/19/23 patient has been doing well, Exos felt pretty good..  - Now ~ 4 weeks, 4 days  from initial onset    Better with: exos  Treatments tried: casting/splinting/bracing  Associated symptoms:  Sensation of stiffness with exos removal. brusing and swelling improved.     The patient is seen by themselves.  The patient is Right handed    Orthopedic/Surgical history: YES - Date: left thumb injury with screw fixation 20+ years ago.  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     REVIEW OF SYSTEMS:  Review of Systems  Skin: no bruising, mild swelling  Musculoskeletal: as above  Neurologic: no numbness,  paresthesias  Remainder of review of systems is negative including constitutional, CV, pulmonary, GI, except as noted in HPI or medical history.    OBJECTIVE:  /89   Pulse 79      General: healthy, alert and in no distress  HEENT: no scleral icterus or conjunctival erythema  Skin: no suspicious lesions or rash. No jaundice.  CV: distal perfusion intact   Resp: normal respiratory effort without conversational dyspnea   Psych: normal mood and affect  Gait: NORMAL  Neuro: Normal light sensory exam of upper extremity     Bilateral Wrist and Hand exam  Inspection:       Swelling: mild left wrist - improved     Tender:       none currently     Non Tender:       Remainder of the Wrist and Hand bilateral     ROM:       Slightly decreased ROM left wrist     Strength:       5/5 strength in the muscles of the hand, wrist and forearm bilateral     Neurovascular:       2+ radial pulses bilaterally with brisk capillary refill and      normal sensation to light touch in the radial, median and ulnar nerve distributions    RADIOLOGY:  Final results and radiologist's interpretation, available in the [facility] health record.  Images were reviewed with the patient in the office today.  My personal interpretation of the performed imaging:   3 XR views of right wrist reviewed: some sclerosis indicating healing dorsal triquetral fracture visible on lateral view, healed scaphoid fracture with screw fixation, STT joint degenerative changes  - will follow official read        Review of the result(s) of each unique test - XR

## 2023-02-13 ENCOUNTER — OFFICE VISIT (OUTPATIENT)
Dept: ORTHOPEDICS | Facility: CLINIC | Age: 48
End: 2023-02-13
Payer: COMMERCIAL

## 2023-02-13 ENCOUNTER — ANCILLARY PROCEDURE (OUTPATIENT)
Dept: GENERAL RADIOLOGY | Facility: CLINIC | Age: 48
End: 2023-02-13
Attending: PEDIATRICS
Payer: COMMERCIAL

## 2023-02-13 VITALS — DIASTOLIC BLOOD PRESSURE: 89 MMHG | HEART RATE: 79 BPM | SYSTOLIC BLOOD PRESSURE: 137 MMHG

## 2023-02-13 DIAGNOSIS — S62.115A CLOSED NONDISPLACED FRACTURE OF TRIQUETRUM OF LEFT WRIST, INITIAL ENCOUNTER: Primary | ICD-10-CM

## 2023-02-13 DIAGNOSIS — S62.115A CLOSED NONDISPLACED FRACTURE OF TRIQUETRUM OF LEFT WRIST, INITIAL ENCOUNTER: ICD-10-CM

## 2023-02-13 PROCEDURE — 73110 X-RAY EXAM OF WRIST: CPT | Mod: TC | Performed by: RADIOLOGY

## 2023-02-13 PROCEDURE — 99213 OFFICE O/P EST LOW 20 MIN: CPT | Performed by: PEDIATRICS

## 2023-02-13 NOTE — LETTER
2/13/2023         RE: Nicholas Cooney  [address] [place] [address] [address]  [place] [address]        Dear Colleague,    Thank you for referring your patient, Nicholas Cooney, to the [facility]. Please see a copy of my visit note below.    ASSESSMENT & PLAN    Nicholas was seen today for recheck.    Diagnoses and all orders for this visit:    Closed nondisplaced fracture of triquetrum of left wrist, initial encounter  -     XR Wrist Left G/E 3 Views; Future      This issue is acute and Improving.    We discussed these other possible diagnosis: Healing triquetral fracture, can transition out of brace    Plan:  - Today's Plan of Care:  Gradually wean out of brace over the next 2-3 weeks  Start gentle range of motion exercises  Discussed activity considerations and other supportive care including Ice/Heat, OTC and other topical medications as needed.    Would limit heavy lifting and weight bearing another month or so    -We also discussed other future treatment options:  Referral to Occupational Therapy    Follow Up: 1 month as needed    Concerning signs and symptoms were reviewed.  The patient expressed understanding of this management plan and all questions were answered at this time.    Samira Spear MD [facility]  Sports Medicine Physician  [facility]      SUBJECTIVE- Interim History February 10, 2023    Chief Complaint   Patient presents with     Left Wrist - RECHECK       Nicholas Cooney is a 47 year old male who is seen in f/u up for left wrist fracure. Since last visit on 1/19/23 patient has been doing well, Exos felt pretty good..  - Now ~ 4 weeks, 4 days  from initial onset    Better with: exos  Treatments tried: casting/splinting/bracing  Associated symptoms:  Sensation of stiffness with exos removal. brusing and swelling improved.     The patient is seen by themselves.  The patient is Right handed    Orthopedic/Surgical history: YES - Date: left thumb injury with screw fixation  20+ years ago.  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     REVIEW OF SYSTEMS:  Review of Systems  Skin: no bruising, mild swelling  Musculoskeletal: as above  Neurologic: no numbness, paresthesias  Remainder of review of systems is negative including constitutional, CV, pulmonary, GI, except as noted in HPI or medical history.    OBJECTIVE:  /89   Pulse 79      General: healthy, alert and in no distress  HEENT: no scleral icterus or conjunctival erythema  Skin: no suspicious lesions or rash. No jaundice.  CV: distal perfusion intact   Resp: normal respiratory effort without conversational dyspnea   Psych: normal mood and affect  Gait: NORMAL  Neuro: Normal light sensory exam of upper extremity     Bilateral Wrist and Hand exam  Inspection:       Swelling: mild left wrist - improved     Tender:       none currently     Non Tender:       Remainder of the Wrist and Hand bilateral     ROM:       Slightly decreased ROM left wrist     Strength:       5/5 strength in the muscles of the hand, wrist and forearm bilateral     Neurovascular:       2+ radial pulses bilaterally with brisk capillary refill and      normal sensation to light touch in the radial, median and ulnar nerve distributions    RADIOLOGY:  Final results and radiologist's interpretation, available in the [facility] health record.  Images were reviewed with the patient in the office today.  My personal interpretation of the performed imaging:   3 XR views of right wrist reviewed: some sclerosis indicating healing dorsal triquetral fracture visible on lateral view, healed scaphoid fracture with screw fixation, STT joint degenerative changes  - will follow official read        Review of the result(s) of each unique test - XR               Again, thank you for allowing me to participate in the care of your patient.        Sincerely,        Samira Spear MD

## 2023-02-13 NOTE — PATIENT INSTRUCTIONS
We discussed these other possible diagnosis: Healing triquetral fracture, can transition out of brace    Plan:  - Today's Plan of Care:  Gradually wean out of brace over the next 2-3 weeks  Start gentle range of motion exercises  Discussed activity considerations and other supportive care including Ice/Heat, OTC and other topical medications as needed.    Would limit heavy lifting and weight bearing another month or so    -We also discussed other future treatment options:  Referral to Occupational Therapy    Follow Up: 1 month as needed    If you have any further questions for your physician or physician s care team you can call [phone] and use option 3 to leave a voice message.

## 2023-07-22 ENCOUNTER — HEALTH MAINTENANCE LETTER (OUTPATIENT)
Age: 48
End: 2023-07-22

## 2023-09-10 ENCOUNTER — OFFICE VISIT (OUTPATIENT)
Dept: URGENT CARE | Facility: URGENT CARE | Age: 48
End: 2023-09-10
Payer: COMMERCIAL

## 2023-09-10 VITALS
TEMPERATURE: 98 F | DIASTOLIC BLOOD PRESSURE: 88 MMHG | HEART RATE: 92 BPM | WEIGHT: 199.7 LBS | BODY MASS INDEX: 28.65 KG/M2 | OXYGEN SATURATION: 96 % | SYSTOLIC BLOOD PRESSURE: 134 MMHG | RESPIRATION RATE: 16 BRPM

## 2023-09-10 DIAGNOSIS — B02.9 HERPES ZOSTER WITHOUT COMPLICATION: Primary | ICD-10-CM

## 2023-09-10 PROCEDURE — 99213 OFFICE O/P EST LOW 20 MIN: CPT | Performed by: STUDENT IN AN ORGANIZED HEALTH CARE EDUCATION/TRAINING PROGRAM

## 2023-09-10 RX ORDER — VALACYCLOVIR HYDROCHLORIDE 1 G/1
1000 TABLET, FILM COATED ORAL 3 TIMES DAILY
Qty: 21 TABLET | Refills: 0 | Status: SHIPPED | OUTPATIENT
Start: 2023-09-10 | End: 2023-09-17

## 2023-09-10 NOTE — PROGRESS NOTES
Assessment & Plan     Herpes zoster without complication  Exam consistent with shingles.  He is within the 72-hour window for treatment so we will start valacyclovir 3 times daily for 7 days.  Discussed that recurrence in the future may occur along the same nerve root and to come in as soon as symptoms start in the future to get on antivirals as soon as possible.  - valACYclovir (VALTREX) 1000 mg tablet  Dispense: 21 tablet; Refill: 0       No follow-ups on file.    SIENA Landa [facility]    Yumiko Peterson is a 47 year old male who presents to clinic today for the following health issues:  Chief Complaint   Patient presents with    Derm Problem     Rash on right upper back, itchy, and painful. Onset- Thursday      HPI      Review of Systems  Constitutional, HEENT, cardiovascular, pulmonary, gi and gu systems are negative, except as otherwise noted.      Objective    /88 (BP Location: Left arm, Patient Position: Sitting, Cuff Size: Adult Regular)   Pulse 92   Temp 98  F (36.7  C) (Tympanic)   Resp 16   Wt 90.6 kg (199 lb 11.2 oz)   SpO2 96%   BMI 28.65 kg/m    Physical Exam   GENERAL APPEARANCE: healthy, alert, and no distress  MS: extremities normal- no gross deformities noted  SKIN: cluster of vesicular lesions on right upper back not crossing midline  NEURO: Normal strength and tone, mentation intact, and speech normal

## 2024-09-14 ENCOUNTER — HEALTH MAINTENANCE LETTER (OUTPATIENT)
Age: 49
End: 2024-09-14